# Patient Record
(demographics unavailable — no encounter records)

---

## 2025-03-12 NOTE — HISTORY OF PRESENT ILLNESS
[FreeTextEntry1] : 73F here for follow up of hypothyroidism, Hashimoto's and osteoporosis. Denies interval health changes or hospitalizations.   She is s/p mitral and aortic valve repair surgery July of 2018. She is doing well from a cardiac standpoint.  Taking levothyroxine 50mcg. She reports taking on empty stomach in AM and not missing doses. Overall feeling well. Denies fatigue, weight changes. Denies cold/heat intolerance. Denies hair loss or skin changes. Denies palpitations or tremors.   History of menopause in early 40s. Osteoporosis diagnosed about 2006 was on Fosamax for about 2-3 years and then stopped out of concern for side effects.  Had DXA in February 2016 showing: L1-L4 T score -3.4 (decreased 8.8% from 2012) Fem neck T score -2.3 (decreased 6.1% from 2012) She was initiated on Boniva x 1.5 years (which she requested so that she could take it more infrequently).  Subsequent DXA 1/23/19: L1-L4 T score -3.2 (prior T score -3.4, + 3.2%) L hip total T score -2.2 (-3.8%) Most recent DKA 1/2021 Spine T score -2.9 (+ 5.4%) L femur -2.5 (+ 0.6%?) Boniva stopped and she was initiated on Prolia 6/2019 and continue on it until missed dose Jan 2022 due to need for dental implants, complex process now completed. It was recommend postpone Prolia administration for a few weeks until 4-6 week michelle after dental implant complete, prolia resumed 7/27/2022. She continues on Caltrate with D once daily. Taking Vitamin D 1,000 IU daily.  Not a lot of dairy in diet, but does eat some cheese, spinach, broccoli as well as half and half in 2 coffees per day. She is active. Up to date with dental care, no planned procedures coming up.  She does note losing 1 inch - now at 5'6'' (prior 5'7'')  DXA 2/8/23  L1-L4 T score -2.5 (improved from -2.9) L fem neck -2.0 Total L hip -2.4 (prior -2.5)   HLD - taking atorvastatin 10 mg daily

## 2025-03-12 NOTE — ASSESSMENT
[FreeTextEntry1] : 73F here for follow up of osteoporosis and hypothyroidism.  1) Osteoporosis - On Prolia since 6/2019, continued q 6months, dose given today. - Last CMP, Calcium level normal. Will check labs with cardiologist next week. - Last DXA (Feb 2023) some improvement at spine, stable at femur.  - Plan to continue with Prolia q 6 months, Prolia administered at today's appointment - Continue calcium-D supplement and D3 1000 u daily plus some dietary calcium. -No active dental concerns - Repeat DEXA due now, ordered.  2) Hypothyroidism due to Hashimotos, positive TPO (2016) -On stable levothyroxine dose since 2016 - Continue levothyroxine 50 mcg po daily in AM on empty stomach - recheck TFTs  3) HLD - Continue atorvastatin 10mg -check lipid profile  4) Prediabetes - 6/2022 HbA1c 5.7% (2022), then improved to normal range. Last value was 5.5% in Feb 2024. - Check annually, include with labs -Discussed screening for T1D and provided handout. Patient will let me know if wants the testing done.  Follow up in 6 months

## 2025-03-12 NOTE — PHYSICAL EXAM
[Alert] : alert [Well Nourished] : well nourished [No Acute Distress] : no acute distress [Well Developed] : well developed [Normal Sclera/Conjunctiva] : normal sclera/conjunctiva [No Proptosis] : no proptosis [No Neck Mass] : no neck mass was observed [Supple] : the neck was supple [Thyroid Not Enlarged] : the thyroid was not enlarged [No Thyroid Nodules] : no palpable thyroid nodules [No Respiratory Distress] : no respiratory distress [No Accessory Muscle Use] : no accessory muscle use [Clear to Auscultation] : lungs were clear to auscultation bilaterally [Normal S1, S2] : normal S1 and S2 [Normal Rate] : heart rate was normal [Regular Rhythm] : with a regular rhythm [No Edema] : no peripheral edema [Not Tender] : non-tender [Not Distended] : not distended [Soft] : abdomen soft [Normal Anterior Cervical Nodes] : no anterior cervical lymphadenopathy [No Spinal Tenderness] : no spinal tenderness [Spine Straight] : spine straight [No Stigmata of Cushings Syndrome] : no stigmata of Cushings Syndrome [Normal Gait] : normal gait [No Tremors] : no tremors [Oriented x3] : oriented to person, place, and time [Normal Affect] : the affect was normal [Normal Mood] : the mood was normal [Acanthosis Nigricans] : no acanthosis nigricans

## 2025-03-12 NOTE — REVIEW OF SYSTEMS
[Negative] : Psychiatric [Fatigue] : no fatigue [Recent Weight Gain (___ Lbs)] : no recent weight gain [Recent Weight Loss (___ Lbs)] : no recent weight loss [Blurred Vision] : no blurred vision [Dysphagia] : no dysphagia [Neck Pain] : no neck pain [Dysphonia] : no dysphonia [Chest Pain] : no chest pain [Palpitations] : no palpitations [Shortness Of Breath] : no shortness of breath [Nausea] : no nausea [Constipation] : no constipation [Abdominal Pain] : no abdominal pain [Vomiting] : no vomiting [Diarrhea] : no diarrhea [Polyuria] : no polyuria [Back Pain] : no back pain [Tremors] : no tremors [Pain/Numbness of Digits] : no pain/numbness of digits [Cold Intolerance] : no cold intolerance [Heat Intolerance] : no heat intolerance